# Patient Record
Sex: MALE | Race: WHITE | URBAN - METROPOLITAN AREA
[De-identification: names, ages, dates, MRNs, and addresses within clinical notes are randomized per-mention and may not be internally consistent; named-entity substitution may affect disease eponyms.]

---

## 2020-12-14 ENCOUNTER — TELEPHONE ENCOUNTER (OUTPATIENT)
Dept: URBAN - METROPOLITAN AREA CLINIC 92 | Facility: CLINIC | Age: 70
End: 2020-12-14

## 2020-12-16 ENCOUNTER — DASHBOARD ENCOUNTERS (OUTPATIENT)
Age: 70
End: 2020-12-16

## 2020-12-18 ENCOUNTER — OFFICE VISIT (OUTPATIENT)
Dept: URBAN - METROPOLITAN AREA TELEHEALTH 2 | Facility: TELEHEALTH | Age: 70
End: 2020-12-18
Payer: MEDICARE

## 2020-12-18 DIAGNOSIS — R14.0 BLOATING: ICD-10-CM

## 2020-12-18 DIAGNOSIS — K52.9 CHRONIC DIARRHEA: ICD-10-CM

## 2020-12-18 PROCEDURE — 99204 OFFICE O/P NEW MOD 45 MIN: CPT | Performed by: INTERNAL MEDICINE

## 2020-12-18 PROCEDURE — G8482 FLU IMMUNIZE ORDER/ADMIN: HCPCS | Performed by: INTERNAL MEDICINE

## 2020-12-18 PROCEDURE — G8420 CALC BMI NORM PARAMETERS: HCPCS | Performed by: INTERNAL MEDICINE

## 2020-12-18 PROCEDURE — 3017F COLORECTAL CA SCREEN DOC REV: CPT | Performed by: INTERNAL MEDICINE

## 2020-12-18 PROCEDURE — G9903 PT SCRN TBCO ID AS NON USER: HCPCS | Performed by: INTERNAL MEDICINE

## 2020-12-18 PROCEDURE — G8427 DOCREV CUR MEDS BY ELIG CLIN: HCPCS | Performed by: INTERNAL MEDICINE

## 2020-12-18 PROCEDURE — 1036F TOBACCO NON-USER: CPT | Performed by: INTERNAL MEDICINE

## 2020-12-18 RX ORDER — OMEGA-3S/DHA/EPA/FISH OIL 120-180-60
CAPSULE,DELAYED RELEASE (ENTERIC COATED) ORAL
Qty: 0 | Refills: 0 | Status: ACTIVE | COMMUNITY
Start: 1900-01-01

## 2020-12-18 RX ORDER — AMLODIPINE BESYLATE 10 MG/1
1 TABLET TABLET ORAL ONCE A DAY
Status: ACTIVE | COMMUNITY

## 2020-12-18 RX ORDER — TRAZODONE HYDROCHLORIDE 100 MG/1
TABLET, FILM COATED ORAL
Qty: 0 | Refills: 0 | Status: ACTIVE | COMMUNITY
Start: 1900-01-01

## 2020-12-18 RX ORDER — UBIDECARENONE 30 MG
CAPSULE ORAL
Qty: 0 | Refills: 0 | Status: ACTIVE | COMMUNITY
Start: 1900-01-01

## 2020-12-18 RX ORDER — FAMOTIDINE 10 MG/1
1 TABLET AS NEEDED TABLET, FILM COATED ORAL TWICE A DAY
Status: ACTIVE | COMMUNITY

## 2020-12-18 RX ORDER — PANTOPRAZOLE SODIUM 40 MG/1
TAKE 1 TABLET (40 MG) BY ORAL ROUTE ONCE DAILY FOR 30 DAYS (START AFTER COMPLETING BREATH TEST) TABLET, DELAYED RELEASE ORAL 1
Qty: 30 | Refills: 6 | Status: ACTIVE | COMMUNITY
Start: 2018-01-22

## 2020-12-18 RX ORDER — ESOMEPRAZOLE MAGNESIUM 40 MG
CAPSULE,DELAYED RELEASE (ENTERIC COATED) ORAL
Qty: 0 | Refills: 0 | COMMUNITY
Start: 1900-01-01

## 2020-12-18 RX ORDER — SUCRALFATE 1 G/1
TABLET ORAL
Qty: 0 | Refills: 0 | COMMUNITY
Start: 1900-01-01

## 2020-12-18 NOTE — HPI-TODAY'S VISIT:
69 y/o male presents for chronic diarrhea X 3 weeks.  Is present daily.  Was going 4 or more times per day.  sometimes liquid sometimes intermixed with formed.  PCP advised to take Imodium, this causes severe constipation and pain.  A day or so later.  Took PeptoBismal last night.  This morning only one bowel movement.  No recent antibiotics use.  NO sick contacts that he is aware.  No changes in medications.  I last saw him in 2018 for reflx.  Had cologuard test 2 years ago was negative.  Doesn't want to think about colon screening in the midst of pandemic.

## 2023-06-19 ENCOUNTER — OFFICE VISIT (OUTPATIENT)
Dept: URBAN - METROPOLITAN AREA CLINIC 115 | Facility: CLINIC | Age: 73
End: 2023-06-19